# Patient Record
Sex: FEMALE | URBAN - METROPOLITAN AREA
[De-identification: names, ages, dates, MRNs, and addresses within clinical notes are randomized per-mention and may not be internally consistent; named-entity substitution may affect disease eponyms.]

---

## 2020-03-23 ENCOUNTER — OFFICE VISIT (OUTPATIENT)
Dept: PRIMARY CARE CLINIC | Facility: CLINIC | Age: 25
End: 2020-03-23
Payer: MEDICAID

## 2020-03-23 VITALS
SYSTOLIC BLOOD PRESSURE: 122 MMHG | TEMPERATURE: 100 F | OXYGEN SATURATION: 98 % | DIASTOLIC BLOOD PRESSURE: 84 MMHG | HEART RATE: 127 BPM

## 2020-03-23 DIAGNOSIS — B34.9 VIRAL ILLNESS: Primary | ICD-10-CM

## 2020-03-23 LAB
INFLUENZA A, MOLECULAR: NEGATIVE
INFLUENZA B, MOLECULAR: NEGATIVE
SPECIMEN SOURCE: NORMAL

## 2020-03-23 PROCEDURE — 87502 INFLUENZA DNA AMP PROBE: CPT

## 2020-03-23 PROCEDURE — 99203 OFFICE O/P NEW LOW 30 MIN: CPT | Mod: S$GLB,,, | Performed by: INTERNAL MEDICINE

## 2020-03-23 PROCEDURE — 99203 PR OFFICE/OUTPT VISIT, NEW, LEVL III, 30-44 MIN: ICD-10-PCS | Mod: S$GLB,,, | Performed by: INTERNAL MEDICINE

## 2020-03-23 NOTE — PROGRESS NOTES
Patient ID: Marva Elizondo is a 24 y.o. female with no prior medical history     Chief Complaint: Fever (102, 99) and Cough (body aches )      HPI -  Fever   Date of symptom onset - started on Saturday at 99 then worsened to 102 temp today. She also has body aches and dry cough that started today. She did not tylenol this am.     Denies diarrhea and GI complaints.   Is eating and drinking fluids.   Denies sob  Works at [employer]        Recent Travel? N   Healthcare Worker? N   Residence? Lives at home with parents and sibling who none have similar sx             No past surgical history on file.    No past medical history on file.    Social History     Socioeconomic History    Marital status: Single     Spouse name: Not on file    Number of children: Not on file    Years of education: Not on file    Highest education level: Not on file   Occupational History    Not on file   Social Needs    Financial resource strain: Not on file    Food insecurity:     Worry: Not on file     Inability: Not on file    Transportation needs:     Medical: Not on file     Non-medical: Not on file   Tobacco Use    Smoking status: Not on file   Substance and Sexual Activity    Alcohol use: Not on file    Drug use: Not on file    Sexual activity: Not on file   Lifestyle    Physical activity:     Days per week: Not on file     Minutes per session: Not on file    Stress: Not on file   Relationships    Social connections:     Talks on phone: Not on file     Gets together: Not on file     Attends Spiritism service: Not on file     Active member of club or organization: Not on file     Attends meetings of clubs or organizations: Not on file     Relationship status: Not on file   Other Topics Concern    Not on file   Social History Narrative    Not on file       Review of Systems   All other systems reviewed and are negative.       Objective:   /84   Pulse (!) 127   Temp 99.8 °F (37.7 °C)   SpO2 98%     Physical Exam    Constitutional: She is oriented to person, place, and time. She appears well-developed and well-nourished.   Appears ill    HENT:   Head: Normocephalic.   Eyes: Conjunctivae and EOM are normal.   Cardiovascular: Regular rhythm.   Tachycardic    Pulmonary/Chest: Effort normal and breath sounds normal.   Lymphadenopathy:     She has no cervical adenopathy.   Neurological: She is alert and oriented to person, place, and time.   Vitals reviewed.          No results found for: WBC, HGB, HCT, PLT, CHOL, TRIG, HDL, LDLDIRECT, ALT, AST, NA, K, CL, CREATININE, BUN, CO2, TSH, PSA, INR, GLUF, HGBA1C, MICROALBUR    No current outpatient medications on file prior to visit.     No current facility-administered medications on file prior to visit.        Assessment:   24 y.o. female with no prior medical hx with sx concerning for viral illness.     Plan:          Covid-19 risk - does not meet criteria for testing given does not have high risk qualifier.     Influenza: negative   Discussed with patient that she  could have COVID-19 or another viral illness and would take precautions for COVID such as limit travel outside of home.  Recommend for pt to stay home from work until her sx resolve starting 14 days from start of sx.          Drink plenty of fluids. Take tylenol/ibuprofen as needed. Given instruction on when to seek further evaluation in urgent care or ED if develops worsening symptoms such as shortness of breath, chest pain.     Carmel Menjivar MD  Internal Medicine- Geriatrics

## 2020-03-24 ENCOUNTER — TELEPHONE (OUTPATIENT)
Dept: FAMILY MEDICINE | Facility: CLINIC | Age: 25
End: 2020-03-24

## 2020-03-24 NOTE — TELEPHONE ENCOUNTER
----- Message from Jong Rodgers sent at 3/23/2020  4:56 PM CDT -----  Contact: pt  Type:  Sooner Apoointment Request    Caller is requesting a sooner appointment.  Caller declined first available appointment listed below.  Caller will not accept being placed on the waitlist and is requesting a message be sent to doctor.    Name of Caller:  Pt   When is the first available appointment? 03/31/2020   Symptoms:    Best Call Back Number:     Additional Information:  Called bc she has aches, fever 101.5 and sniffling, would like to be seen asap

## 2020-03-24 NOTE — TELEPHONE ENCOUNTER
----- Message from Joslyn Mistry sent at 3/24/2020 10:36 AM CDT -----  Contact: pt  Type:  Test Results    Who Called:  Pt   Name of Test (Lab/Mammo/Etc):  Covid testing  Date of Test:  03/23  Ordering Provider:  Dr Pavon  Where the test was performed:  [facility]  Best Call Back Number:  [phone] (home)     Additional Information:  na

## 2020-03-27 ENCOUNTER — NURSE TRIAGE (OUTPATIENT)
Dept: ADMINISTRATIVE | Facility: CLINIC | Age: 25
End: 2020-03-27

## 2020-03-27 ENCOUNTER — TELEPHONE (OUTPATIENT)
Dept: FAMILY MEDICINE | Facility: CLINIC | Age: 25
End: 2020-03-27

## 2020-03-27 NOTE — TELEPHONE ENCOUNTER
----- Message from Darya Rowley sent at 3/27/2020 12:53 PM CDT -----  Contact: pt  Type:  Patient Returning Call    Who Called:  pt  Who Left Message for Patient:  office  Does the patient know what this is regarding?:  Please return call to pt regarding results.  Best Call Back Number:    Additional Information:  Thank you

## 2020-03-28 NOTE — TELEPHONE ENCOUNTER
Reason for Disposition   Message left on identified answering machine.    Additional Information   Negative: Caller is angry or rude (e.g., hangs up, verbally abusive, yelling)   Negative: Caller hangs up   Negative: Caller has already spoken with the PCP and has no further questions.   Negative: Caller has already spoken with another triager and has no further questions.   Negative: Caller has already spoken with another triager or PCP AND has further questions AND triager able to answer questions.   Negative: No answer.  First attempt to contact caller.  Follow-up call scheduled within 15 minutes.   Negative: Busy signal.  First attempt to contact caller.  Follow-up call scheduled within 15 minutes.    Protocols used: ST NO CONTACT OR DUPLICATE CONTACT CALL-A-AH

## 2020-07-03 ENCOUNTER — OCCUPATIONAL HEALTH (OUTPATIENT)
Dept: URGENT CARE | Facility: CLINIC | Age: 25
End: 2020-07-03

## 2020-07-03 PROCEDURE — 80305 DRUG TEST PRSMV DIR OPT OBS: CPT | Mod: S$GLB,,, | Performed by: EMERGENCY MEDICINE

## 2020-07-03 PROCEDURE — 80305 PR DRUG SCREEN - 1: ICD-10-PCS | Mod: S$GLB,,, | Performed by: EMERGENCY MEDICINE

## 2020-07-10 ENCOUNTER — OCCUPATIONAL HEALTH (OUTPATIENT)
Dept: URGENT CARE | Facility: CLINIC | Age: 25
End: 2020-07-10

## 2020-07-10 DIAGNOSIS — Z02.89 ENCOUNTER FOR PHYSICAL EXAMINATION RELATED TO EMPLOYMENT: ICD-10-CM

## 2020-07-10 PROCEDURE — 86580 TB INTRADERMAL TEST: CPT | Mod: S$GLB,,, | Performed by: EMERGENCY MEDICINE

## 2020-07-10 PROCEDURE — 86580 PR  TB INTRADERMAL TEST: ICD-10-PCS | Mod: S$GLB,,, | Performed by: EMERGENCY MEDICINE

## 2020-07-30 DIAGNOSIS — N64.59 OTHER SIGNS AND SYMPTOMS IN BREAST: Primary | ICD-10-CM

## 2020-10-28 ENCOUNTER — OCCUPATIONAL HEALTH (OUTPATIENT)
Dept: URGENT CARE | Facility: CLINIC | Age: 25
End: 2020-10-28
Payer: MEDICAID

## 2020-10-28 PROCEDURE — 90471 PR IMMUNIZ ADMIN,1 SINGLE/COMB VAC/TOXOID: ICD-10-PCS | Mod: S$GLB,,, | Performed by: EMERGENCY MEDICINE

## 2020-10-28 PROCEDURE — 90686 IIV4 VACC NO PRSV 0.5 ML IM: CPT | Mod: S$GLB,,, | Performed by: EMERGENCY MEDICINE

## 2020-10-28 PROCEDURE — 90686 PR FLU VACCINE, QIIV4, NO PRSV, 0.5 ML, IM: ICD-10-PCS | Mod: S$GLB,,, | Performed by: EMERGENCY MEDICINE

## 2020-10-28 PROCEDURE — 90471 IMMUNIZATION ADMIN: CPT | Mod: S$GLB,,, | Performed by: EMERGENCY MEDICINE

## 2021-05-06 ENCOUNTER — PATIENT MESSAGE (OUTPATIENT)
Dept: RESEARCH | Facility: HOSPITAL | Age: 26
End: 2021-05-06

## 2025-04-10 ENCOUNTER — HOSPITAL ENCOUNTER (OUTPATIENT)
Facility: HOSPITAL | Age: 30
Discharge: HOME | End: 2025-04-12
Attending: EMERGENCY MEDICINE | Admitting: HOSPITALIST
Payer: COMMERCIAL

## 2025-04-10 DIAGNOSIS — K52.9 GASTROENTERITIS: ICD-10-CM

## 2025-04-10 DIAGNOSIS — E86.9 VOLUME DEPLETION: Primary | ICD-10-CM

## 2025-04-10 DIAGNOSIS — D50.9 IRON DEFICIENCY ANEMIA, UNSPECIFIED IRON DEFICIENCY ANEMIA TYPE: ICD-10-CM

## 2025-04-10 DIAGNOSIS — R00.0 TACHYCARDIA: ICD-10-CM

## 2025-04-10 PROBLEM — E87.6 HYPOKALEMIA: Status: ACTIVE | Noted: 2025-04-10

## 2025-04-10 PROBLEM — D64.9 ANEMIA: Status: ACTIVE | Noted: 2025-04-10

## 2025-04-10 PROBLEM — E83.42 HYPOMAGNESEMIA: Status: ACTIVE | Noted: 2025-04-10

## 2025-04-10 LAB
ABSOLUTE EOSINOPHIL (SMH): 0.06 K/UL
ABSOLUTE MONOCYTE (SMH): 0.27 K/UL (ref 0.3–1)
ABSOLUTE NEUTROPHIL COUNT (SMH): 7.3 K/UL (ref 1.8–7.7)
ADV 40+41 DNA STL QL NAA+NON-PROBE: NOT DETECTED
ALBUMIN SERPL-MCNC: 4.1 G/DL (ref 3.5–5.2)
ALP SERPL-CCNC: 61 UNIT/L (ref 55–135)
ALT SERPL-CCNC: 10 UNIT/L (ref 10–44)
ANION GAP (SMH): 7 MMOL/L (ref 8–16)
AST SERPL-CCNC: 14 UNIT/L (ref 10–40)
ASTRO TYP 1-8 RNA STL QL NAA+NON-PROBE: DETECTED
B-HCG UR QL: NEGATIVE
BACTERIA #/AREA URNS AUTO: ABNORMAL /HPF
BASOPHILS # BLD AUTO: 0.01 K/UL
BASOPHILS NFR BLD AUTO: 0.1 %
BILIRUB SERPL-MCNC: 0.4 MG/DL (ref 0.1–1)
BILIRUB UR QL STRIP.AUTO: NEGATIVE
BUN SERPL-MCNC: 11 MG/DL (ref 6–20)
C CAYETANENSIS DNA STL QL NAA+NON-PROBE: NOT DETECTED
C COLI+JEJ+UPSA DNA STL QL NAA+NON-PROBE: NOT DETECTED
C DIFF GDH STL QL: NEGATIVE
C DIFF TOX A+B STL QL IA: NEGATIVE
CALCIUM SERPL-MCNC: 8.9 MG/DL (ref 8.7–10.5)
CHLORIDE SERPL-SCNC: 105 MMOL/L (ref 95–110)
CLARITY UR: ABNORMAL
CO2 SERPL-SCNC: 25 MMOL/L (ref 23–29)
COLOR UR AUTO: YELLOW
CREAT SERPL-MCNC: 0.6 MG/DL (ref 0.5–1.4)
CREAT SERPL-MCNC: 0.7 MG/DL (ref 0.5–1.4)
CRYPTOSP DNA STL QL NAA+NON-PROBE: NOT DETECTED
CTP QC/QA: YES
E HISTOLYT DNA STL QL NAA+NON-PROBE: NOT DETECTED
EAEC PAA PLAS AGGR+AATA ST NAA+NON-PRB: NOT DETECTED
EC STX1+STX2 GENES STL QL NAA+NON-PROBE: NOT DETECTED
EPEC EAE GENE STL QL NAA+NON-PROBE: NOT DETECTED
ERYTHROCYTE [DISTWIDTH] IN BLOOD BY AUTOMATED COUNT: 17.9 % (ref 11.5–14.5)
ETEC LTA+ST1A+ST1B TOX ST NAA+NON-PROBE: NOT DETECTED
FERRITIN SERPL-MCNC: 2.3 NG/ML (ref 20–300)
G LAMBLIA DNA STL QL NAA+NON-PROBE: NOT DETECTED
GFR SERPLBLD CREATININE-BSD FMLA CKD-EPI: >60 ML/MIN/1.73/M2
GLUCOSE SERPL-MCNC: 105 MG/DL (ref 70–110)
GLUCOSE UR QL STRIP: NEGATIVE
GROUP A STREP MOLECULAR (OHS): NEGATIVE
HCT VFR BLD AUTO: 29.9 % (ref 37–48.5)
HCV AB SERPL QL IA: NORMAL
HGB BLD-MCNC: 8.7 GM/DL (ref 12–16)
HGB UR QL STRIP: ABNORMAL
HIV 1+2 AB+HIV1 P24 AG SERPL QL IA: NORMAL
IMM GRANULOCYTES # BLD AUTO: 0.03 K/UL (ref 0–0.04)
IMM GRANULOCYTES NFR BLD AUTO: 0.3 % (ref 0–0.5)
INFLUENZA A MOLECULAR (OHS): NEGATIVE
INFLUENZA B MOLECULAR (OHS): NEGATIVE
IRON SATN MFR SERPL: <10 % (ref 20–50)
IRON SERPL-MCNC: 11 UG/DL (ref 30–160)
KETONES UR QL STRIP: NEGATIVE
LACTATE SERPL-SCNC: 1.3 MMOL/L (ref 0.5–1.9)
LDH SERPL L TO P-CCNC: 0.91 MMOL/L (ref 0.5–2.2)
LEUKOCYTE ESTERASE UR QL STRIP: ABNORMAL
LIPASE SERPL-CCNC: 42 U/L (ref 4–60)
LYMPHOCYTES # BLD AUTO: 0.93 K/UL (ref 1–4.8)
MAGNESIUM SERPL-MCNC: 1.5 MG/DL (ref 1.6–2.6)
MCH RBC QN AUTO: 19.2 PG (ref 27–31)
MCHC RBC AUTO-ENTMCNC: 29.1 G/DL (ref 32–36)
MCV RBC AUTO: 66 FL (ref 82–98)
MICROSCOPIC COMMENT: ABNORMAL
NITRITE UR QL STRIP: NEGATIVE
NOROVIRUS GI+II RNA STL QL NAA+NON-PROBE: NOT DETECTED
NUCLEATED RBC (/100WBC) (SMH): 0 /100 WBC
P SHIGELLOIDES DNA STL QL NAA+NON-PROBE: NOT DETECTED
PH UR STRIP: 6 [PH]
PLATELET # BLD AUTO: 712 K/UL (ref 150–450)
PMV BLD AUTO: 8.3 FL (ref 9.2–12.9)
POTASSIUM SERPL-SCNC: 3.4 MMOL/L (ref 3.5–5.1)
PROT SERPL-MCNC: 7.8 GM/DL (ref 6–8.4)
PROT UR QL STRIP: ABNORMAL
RBC # BLD AUTO: 4.53 M/UL (ref 4–5.4)
RBC #/AREA URNS AUTO: 1 /HPF
RELATIVE EOSINOPHIL (SMH): 0.7 % (ref 0–8)
RELATIVE LYMPHOCYTE (SMH): 10.8 % (ref 18–48)
RELATIVE MONOCYTE (SMH): 3.1 % (ref 4–15)
RELATIVE NEUTROPHIL (SMH): 85 % (ref 38–73)
RETICS/RBC NFR AUTO: 1.2 % (ref 0.5–2.5)
RVA RNA STL QL NAA+NON-PROBE: NOT DETECTED
S ENT+BONG DNA STL QL NAA+NON-PROBE: NOT DETECTED
SAMPLE: NORMAL
SAMPLE: NORMAL
SAPO I+II+IV+V RNA STL QL NAA+NON-PROBE: DETECTED
SARS-COV-2 RDRP RESP QL NAA+PROBE: NEGATIVE
SHIGELLA SP+EIEC IPAH ST NAA+NON-PROBE: NOT DETECTED
SODIUM SERPL-SCNC: 137 MMOL/L (ref 136–145)
SP GR UR STRIP: 1.02
SQUAMOUS #/AREA URNS AUTO: 8 /HPF
TIBC SERPL-MCNC: 620 UG/DL (ref 250–450)
TRANSFERRIN SERPL-MCNC: 443 MG/DL (ref 200–375)
TROPONIN HIGH SENSITIVE (SMH): <2.3 PG/ML
TSH SERPL-ACNC: 1.12 UIU/ML (ref 0.34–5.6)
UROBILINOGEN UR STRIP-ACNC: NEGATIVE EU/DL
V CHOL+PARA+VUL DNA STL QL NAA+NON-PROBE: NOT DETECTED
V CHOLERAE DNA STL QL NAA+NON-PROBE: NOT DETECTED
WBC # BLD AUTO: 8.6 K/UL (ref 3.9–12.7)
WBC #/AREA URNS AUTO: 6 /HPF
Y ENTEROCOL DNA STL QL NAA+NON-PROBE: NOT DETECTED

## 2025-04-10 PROCEDURE — 93005 ELECTROCARDIOGRAM TRACING: CPT | Performed by: GENERAL PRACTICE

## 2025-04-10 PROCEDURE — G0378 HOSPITAL OBSERVATION PER HR: HCPCS

## 2025-04-10 PROCEDURE — 85025 COMPLETE CBC W/AUTO DIFF WBC: CPT | Performed by: EMERGENCY MEDICINE

## 2025-04-10 PROCEDURE — 81025 URINE PREGNANCY TEST: CPT | Performed by: EMERGENCY MEDICINE

## 2025-04-10 PROCEDURE — 99285 EMERGENCY DEPT VISIT HI MDM: CPT | Mod: 25

## 2025-04-10 PROCEDURE — 83690 ASSAY OF LIPASE: CPT | Performed by: EMERGENCY MEDICINE

## 2025-04-10 PROCEDURE — 80053 COMPREHEN METABOLIC PANEL: CPT | Performed by: EMERGENCY MEDICINE

## 2025-04-10 PROCEDURE — 96367 TX/PROPH/DG ADDL SEQ IV INF: CPT

## 2025-04-10 PROCEDURE — 96376 TX/PRO/DX INJ SAME DRUG ADON: CPT

## 2025-04-10 PROCEDURE — 63600175 PHARM REV CODE 636 W HCPCS: Performed by: INTERNAL MEDICINE

## 2025-04-10 PROCEDURE — 25000003 PHARM REV CODE 250: Performed by: NURSE PRACTITIONER

## 2025-04-10 PROCEDURE — 25000003 PHARM REV CODE 250: Performed by: EMERGENCY MEDICINE

## 2025-04-10 PROCEDURE — 96366 THER/PROPH/DIAG IV INF ADDON: CPT

## 2025-04-10 PROCEDURE — 96375 TX/PRO/DX INJ NEW DRUG ADDON: CPT

## 2025-04-10 PROCEDURE — 84443 ASSAY THYROID STIM HORMONE: CPT | Performed by: EMERGENCY MEDICINE

## 2025-04-10 PROCEDURE — 81001 URINALYSIS AUTO W/SCOPE: CPT | Performed by: EMERGENCY MEDICINE

## 2025-04-10 PROCEDURE — 36415 COLL VENOUS BLD VENIPUNCTURE: CPT | Performed by: EMERGENCY MEDICINE

## 2025-04-10 PROCEDURE — 87507 IADNA-DNA/RNA PROBE TQ 12-25: CPT | Performed by: EMERGENCY MEDICINE

## 2025-04-10 PROCEDURE — 63600175 PHARM REV CODE 636 W HCPCS: Performed by: EMERGENCY MEDICINE

## 2025-04-10 PROCEDURE — 87040 BLOOD CULTURE FOR BACTERIA: CPT | Performed by: EMERGENCY MEDICINE

## 2025-04-10 PROCEDURE — 83605 ASSAY OF LACTIC ACID: CPT | Performed by: EMERGENCY MEDICINE

## 2025-04-10 PROCEDURE — 87449 NOS EACH ORGANISM AG IA: CPT | Performed by: EMERGENCY MEDICINE

## 2025-04-10 PROCEDURE — 83540 ASSAY OF IRON: CPT | Performed by: EMERGENCY MEDICINE

## 2025-04-10 PROCEDURE — 86803 HEPATITIS C AB TEST: CPT | Performed by: EMERGENCY MEDICINE

## 2025-04-10 PROCEDURE — 84484 ASSAY OF TROPONIN QUANT: CPT | Performed by: EMERGENCY MEDICINE

## 2025-04-10 PROCEDURE — 82728 ASSAY OF FERRITIN: CPT | Performed by: EMERGENCY MEDICINE

## 2025-04-10 PROCEDURE — 87651 STREP A DNA AMP PROBE: CPT | Performed by: EMERGENCY MEDICINE

## 2025-04-10 PROCEDURE — 87389 HIV-1 AG W/HIV-1&-2 AB AG IA: CPT | Performed by: EMERGENCY MEDICINE

## 2025-04-10 PROCEDURE — U0002 COVID-19 LAB TEST NON-CDC: HCPCS | Performed by: EMERGENCY MEDICINE

## 2025-04-10 PROCEDURE — 85045 AUTOMATED RETICULOCYTE COUNT: CPT | Performed by: EMERGENCY MEDICINE

## 2025-04-10 PROCEDURE — 96361 HYDRATE IV INFUSION ADD-ON: CPT

## 2025-04-10 PROCEDURE — 25000003 PHARM REV CODE 250: Performed by: INTERNAL MEDICINE

## 2025-04-10 PROCEDURE — 87502 INFLUENZA DNA AMP PROBE: CPT | Performed by: EMERGENCY MEDICINE

## 2025-04-10 PROCEDURE — 96365 THER/PROPH/DIAG IV INF INIT: CPT

## 2025-04-10 PROCEDURE — 83735 ASSAY OF MAGNESIUM: CPT | Performed by: EMERGENCY MEDICINE

## 2025-04-10 PROCEDURE — 82565 ASSAY OF CREATININE: CPT

## 2025-04-10 PROCEDURE — 25500020 PHARM REV CODE 255: Performed by: EMERGENCY MEDICINE

## 2025-04-10 PROCEDURE — 93010 ELECTROCARDIOGRAM REPORT: CPT | Mod: ,,, | Performed by: GENERAL PRACTICE

## 2025-04-10 RX ORDER — HYOSCYAMINE SULFATE 0.5 MG/ML
0.12 INJECTION, SOLUTION SUBCUTANEOUS
Status: COMPLETED | OUTPATIENT
Start: 2025-04-10 | End: 2025-04-10

## 2025-04-10 RX ORDER — LANOLIN ALCOHOL/MO/W.PET/CERES
800 CREAM (GRAM) TOPICAL
Status: DISCONTINUED | OUTPATIENT
Start: 2025-04-10 | End: 2025-04-12 | Stop reason: HOSPADM

## 2025-04-10 RX ORDER — ONDANSETRON HYDROCHLORIDE 2 MG/ML
4 INJECTION, SOLUTION INTRAVENOUS
Status: COMPLETED | OUTPATIENT
Start: 2025-04-10 | End: 2025-04-10

## 2025-04-10 RX ORDER — SODIUM CHLORIDE 9 MG/ML
INJECTION, SOLUTION INTRAVENOUS CONTINUOUS
Status: DISCONTINUED | OUTPATIENT
Start: 2025-04-10 | End: 2025-04-10

## 2025-04-10 RX ORDER — MAGNESIUM SULFATE HEPTAHYDRATE 40 MG/ML
2 INJECTION, SOLUTION INTRAVENOUS ONCE
Status: COMPLETED | OUTPATIENT
Start: 2025-04-10 | End: 2025-04-10

## 2025-04-10 RX ORDER — SODIUM CHLORIDE 9 MG/ML
INJECTION, SOLUTION INTRAVENOUS CONTINUOUS
Status: DISCONTINUED | OUTPATIENT
Start: 2025-04-10 | End: 2025-04-12 | Stop reason: HOSPADM

## 2025-04-10 RX ORDER — SODIUM,POTASSIUM PHOSPHATES 280-250MG
2 POWDER IN PACKET (EA) ORAL
Status: DISCONTINUED | OUTPATIENT
Start: 2025-04-10 | End: 2025-04-12 | Stop reason: HOSPADM

## 2025-04-10 RX ORDER — SODIUM CHLORIDE 0.9 % (FLUSH) 0.9 %
2 SYRINGE (ML) INJECTION EVERY 8 HOURS PRN
Status: DISCONTINUED | OUTPATIENT
Start: 2025-04-10 | End: 2025-04-12 | Stop reason: HOSPADM

## 2025-04-10 RX ORDER — FAMOTIDINE 20 MG/1
20 TABLET, FILM COATED ORAL 2 TIMES DAILY
Status: DISCONTINUED | OUTPATIENT
Start: 2025-04-10 | End: 2025-04-12 | Stop reason: HOSPADM

## 2025-04-10 RX ORDER — ONDANSETRON HYDROCHLORIDE 2 MG/ML
4 INJECTION, SOLUTION INTRAVENOUS EVERY 6 HOURS PRN
Status: DISCONTINUED | OUTPATIENT
Start: 2025-04-10 | End: 2025-04-12 | Stop reason: HOSPADM

## 2025-04-10 RX ORDER — IBUPROFEN 400 MG/1
400 TABLET ORAL EVERY 6 HOURS PRN
Status: DISCONTINUED | OUTPATIENT
Start: 2025-04-10 | End: 2025-04-12 | Stop reason: HOSPADM

## 2025-04-10 RX ORDER — ACETAMINOPHEN 325 MG/1
650 TABLET ORAL EVERY 4 HOURS PRN
Status: DISCONTINUED | OUTPATIENT
Start: 2025-04-10 | End: 2025-04-12 | Stop reason: HOSPADM

## 2025-04-10 RX ORDER — SODIUM CHLORIDE 9 MG/ML
1000 INJECTION, SOLUTION INTRAVENOUS
Status: COMPLETED | OUTPATIENT
Start: 2025-04-10 | End: 2025-04-10

## 2025-04-10 RX ORDER — NALOXONE HCL 0.4 MG/ML
0.02 VIAL (ML) INJECTION
Status: DISCONTINUED | OUTPATIENT
Start: 2025-04-10 | End: 2025-04-12 | Stop reason: HOSPADM

## 2025-04-10 RX ADMIN — SODIUM CHLORIDE 1000 ML: 9 INJECTION, SOLUTION INTRAVENOUS at 04:04

## 2025-04-10 RX ADMIN — FAMOTIDINE 20 MG: 20 TABLET, FILM COATED ORAL at 09:04

## 2025-04-10 RX ADMIN — SODIUM CHLORIDE 1000 ML: 9 INJECTION, SOLUTION INTRAVENOUS at 12:04

## 2025-04-10 RX ADMIN — POTASSIUM BICARBONATE 25 MEQ: 978 TABLET, EFFERVESCENT ORAL at 04:04

## 2025-04-10 RX ADMIN — ACETAMINOPHEN 650 MG: 325 TABLET ORAL at 06:04

## 2025-04-10 RX ADMIN — MAGNESIUM SULFATE HEPTAHYDRATE 2 G: 40 INJECTION, SOLUTION INTRAVENOUS at 04:04

## 2025-04-10 RX ADMIN — ONDANSETRON 4 MG: 2 INJECTION INTRAMUSCULAR; INTRAVENOUS at 12:04

## 2025-04-10 RX ADMIN — IOHEXOL 100 ML: 350 INJECTION, SOLUTION INTRAVENOUS at 02:04

## 2025-04-10 RX ADMIN — SODIUM CHLORIDE 1000 ML: 9 INJECTION, SOLUTION INTRAVENOUS at 09:04

## 2025-04-10 RX ADMIN — PIPERACILLIN AND TAZOBACTAM 4.5 G: 4; .5 INJECTION, POWDER, LYOPHILIZED, FOR SOLUTION INTRAVENOUS; PARENTERAL at 09:04

## 2025-04-10 RX ADMIN — HYOSCYAMINE SULFATE 0.12 MG: 0.5 INJECTION, SOLUTION SUBCUTANEOUS at 12:04

## 2025-04-10 RX ADMIN — IBUPROFEN 400 MG: 400 TABLET, FILM COATED ORAL at 09:04

## 2025-04-10 RX ADMIN — HYOSCYAMINE SULFATE 0.12 MG: 0.5 INJECTION, SOLUTION SUBCUTANEOUS at 03:04

## 2025-04-10 NOTE — ASSESSMENT & PLAN NOTE
Patient has Abnormal Magnesium: hypomagnesemia. Will continue to monitor electrolytes closely. Will replace the affected electrolytes and repeat labs to be done after interventions completed. The patient's magnesium results have been reviewed and are listed below.  Recent Labs   Lab 04/10/25  1247   MG 1.5*

## 2025-04-10 NOTE — H&P
[facility] - Emergency Dept  Hospital Medicine  History & Physical    Patient Name: Marva Elizondo  MRN: [id number]  Patient Class: OP- Observation  Admission Date: 4/10/2025  Attending Physician: Roni Barnett MD   Primary Care Provider: Nimco Underwood NP         Patient information was obtained from patient, past medical records, and ER records.     Subjective:     Principal Problem:Gastroenteritis    Chief Complaint:   Chief Complaint   Patient presents with    Diarrhea     Diarrhea and vomiting x 6 days with abdominal pain and a cough    Abdominal Pain    Cough    Vomiting        HPI: 29-year-old female with no significant medical history presents to the emergency room for vomiting and diarrhea for 6 days, with abdominal pain, fever of 101, body aches, nausea, and diarrhea.    In the ER, CBC with hemoglobin 8.6 and MCV 60 iron studies consistent with iron-deficiency anemia, CMP with potassium 3.4, Mag 1.5 EKG with normal sinus rhythm, C diff negative, influenza negative COVID negative chest x-ray with no infiltrates or consolidation.  Cat scan of abdomen likely enteritis.  Stool culture sent positive for Astrovirus and Sapovirus   Magnesium and potassium replaced in ER. Levsin given, IV NS bolus given in ER.     Admit to hospital medicine for gastroenteritis, electrolyte imbalance.     No past medical history on file.    No past surgical history on file.    Review of patient's allergies indicates:  No Known Allergies    No current facility-administered medications on file prior to encounter.     Current Outpatient Medications on File Prior to Encounter   Medication Sig    [DISCONTINUED] ketorolac (TORADOL) 10 mg tablet Take 1 tablet (10 mg total) by mouth every 6 (six) hours.     Family History    None       Tobacco Use    Smoking status: Never    Smokeless tobacco: Never   Substance and Sexual Activity    Alcohol use: Never    Drug use: Not on file    Sexual activity: Not on file     Review  of Systems   Constitutional:  Positive for appetite change, chills and fever.   HENT: Negative.     Respiratory:  Positive for cough.    Cardiovascular: Negative.    Gastrointestinal:  Positive for abdominal pain, diarrhea, nausea and vomiting. Negative for blood in stool and constipation.   Genitourinary: Negative.    Musculoskeletal: Negative.    Skin: Negative.    Neurological: Negative.    Psychiatric/Behavioral: Negative.       Objective:     Vital Signs (Most Recent):  Temp: 98.5 °F (36.9 °C) (04/10/25 1152)  Pulse: 105 (04/10/25 1632)  Resp: 16 (04/10/25 1632)  BP: 130/60 (04/10/25 1632)  SpO2: 100 % (04/10/25 1632) Vital Signs (24h Range):  Temp:  [98.5 °F (36.9 °C)] 98.5 °F (36.9 °C)  Pulse:  [105-112] 105  Resp:  [16-18] 16  SpO2:  [98 %-100 %] 100 %  BP: (107-130)/(60-77) 130/60     Weight: 74.8 kg (165 lb)  Body mass index is 28.32 kg/m².     Physical Exam  Vitals reviewed.   Constitutional:       General: She is not in acute distress.     Appearance: Normal appearance. She is not ill-appearing.   HENT:      Head: Normocephalic and atraumatic.      Mouth/Throat:      Mouth: Mucous membranes are dry.   Eyes:      Extraocular Movements: Extraocular movements intact.      Pupils: Pupils are equal, round, and reactive to light.   Cardiovascular:      Rate and Rhythm: Tachycardia present.      Pulses: Normal pulses.   Pulmonary:      Effort: Pulmonary effort is normal.      Breath sounds: No wheezing.   Abdominal:      Comments: Diffuse abdominal tenderness.    Musculoskeletal:         General: Normal range of motion.      Cervical back: Neck supple.   Skin:     General: Skin is warm.      Capillary Refill: Capillary refill takes 2 to 3 seconds.   Neurological:      General: No focal deficit present.      Mental Status: She is alert. Mental status is at baseline.   Psychiatric:         Mood and Affect: Mood normal.          CRANIAL NERVES     CN III, IV, VI   Pupils are equal, round, and reactive to light.      Significant Labs: All pertinent labs within the past 24 hours have been reviewed.  CBC:   Recent Labs   Lab 04/10/25  1247   WBC 8.60   HGB 8.7*   HCT 29.9*   *     CMP:   Recent Labs   Lab 04/10/25  1247      K 3.4*   CO2 25   BUN 11   CREATININE 0.7   CALCIUM 8.9   ALBUMIN 4.1   BILITOT 0.4   ALKPHOS 61   AST 14   ALT 10     Magnesium:   Recent Labs   Lab 04/10/25  1247   MG 1.5*     TSH:   Recent Labs   Lab 04/10/25  1247   TSH 1.116     Urine Studies:   Recent Labs   Lab 04/10/25  1150   APPEARANCEUA Cloudy*   SPECGRAV 1.020   PROTEINUA Trace*   BILIRUBINUA Negative   UROBILINOGEN Negative   LEUKOCYTESUR Trace*   RBCUA 1   WBCUA 6*   BACTERIA Moderate*       Significant Imaging: I have reviewed all pertinent imaging results/findings within the past 24 hours.  Imaging Results              CT Abdomen Pelvis With IV Contrast NO Oral Contrast (Final result)  Result time 04/10/25 14:39:56      Final result by Darlene Emery MD (04/10/25 14:39:56)                   Impression:      Fluid-filled loops of small bowel and colon without distension or wall thickening.  Findings may be secondary to enteritis.    4.0 x 5.3 cm intramural fibroid      Electronically signed by: Darlene Emery  Date:    04/10/2025  Time:    14:39               Narrative:      CMS MANDATED QUALITY DATA - CT RADIATION - 436    All CT scans at this facility utilize dose modulation, iterative reconstruction, and/or weight based dosing when appropriate to reduce radiation dose to as low as reasonably achievable.    CLINICAL HISTORY:  ([id number])28 y/o  (1995) F    Abdominal abscess/infection suspected;    TECHNIQUE:  (A#[id number], exam time 4/10/2025 14:31)    CT ABDOMEN PELVIS WITH IV CONTRAST [id number]    Axial CT images of the abdomen and pelvis were obtained from the dome of the diaphragm to the proximal thighs.    100cc omnipague 350 IV contrast was given    COMPARISON:  None available.    FINDINGS:  Lower Thorax:    The  lung bases are clear. The heart is normal in size.    CT Abdomen:    Liver: The liver is normal in size and imaging appearance.    Gallbladder: The gallbladder is within normal limits.    Biliary Tree: No intra or extrahepatic ductal dilation.    Spleen: Normal.    Pancreas: The pancreas is normal.    Adrenal Glands: Normal    Kidneys: The kidneys are normal in imaging appearance without hydronephrosis or hydroureter.    Vasculature: The aorta is normal in course and caliber.    Lymph nodes: No abdominal lymphadenopathy is seen.    Intraperitoneal structures: There is no ascites.    Bowel: Stomach is normal.  There are fluid-filled loops of small bowel and colon which are not dilated or thickened.  Findings may be secondary to enteritis.    Abdominal wall: The abdominal wall and musculature are normal.    Musculoskeletal: Normal.    CT Pelvis:    Bladder: Normal.    Reproductive Organs: There is a 4.0 x 5.3 cm intramural fibroid.  The ovaries are normal.    Pelvic Lymph nodes: No pelvic lymphadenopathy or pelvic mass is identified.                                       X-Ray Chest PA And Lateral (Final result)  Result time 04/10/25 12:25:22   Procedure changed from X-Ray Chest AP Portable     Final result by Juan M Starkey MD (04/10/25 12:25:22)                   Impression:      1. No acute process.      Electronically signed by: Juan M Starkey  Date:    04/10/2025  Time:    12:25               Narrative:    EXAMINATION:  XR CHEST PA AND LATERAL    CLINICAL HISTORY:  Cough;    COMPARISON:  March 2021    FINDINGS:  PA and lateral views demonstrate no cardiac, pulmonary, or acute osseous abnormalities.                                       Assessment/Plan:     Assessment & Plan  Gastroenteritis  + Sapovirus and astrovirus, contact precautions  CT abdomen with enteritis  Treat symptomatically  [place] diet.     Anemia  Anemia is likely due to Iron deficiency. Most recent hemoglobin and hematocrit are listed  below.  Recent Labs     04/10/25  1247   HGB 8.7*   HCT 29.9*     Plan  - Monitor serial CBC: Daily  - Transfuse PRBC if patient becomes hemodynamically unstable, symptomatic or H/H drops below 7/21.    Hypokalemia  Patient's most recent potassium results are listed below.   Recent Labs     04/10/25  1247   K 3.4*     Plan  - Replete potassium per protocol  - Monitor potassium Daily  - Patient's hypokalemia is worsening. Will continue current treatment    Hypomagnesemia  Patient has Abnormal Magnesium: hypomagnesemia. Will continue to monitor electrolytes closely. Will replace the affected electrolytes and repeat labs to be done after interventions completed. The patient's magnesium results have been reviewed and are listed below.  Recent Labs   Lab 04/10/25  1247   MG 1.5*      Volume depletion  Repleat with NS infusion    VTE Risk Mitigation (From admission, onward)           Ordered     IP VTE LOW RISK PATIENT  Once         04/10/25 1633     Place sequential compression device  Until discontinued         04/10/25 1633                         On 04/10/2025, patient should be placed in hospital observation services under my care in collaboration with Dr. Barnett.           Adrianne Asif, NP  Department of Hospital Medicine  [facility] - Emergency Dept

## 2025-04-10 NOTE — LETTER
April 12, 2025         [address] [place] [address] [address] [id number]       Patient: Marva Elizondo   YOB: 1995  Date of Admission 4/10/2025    To Whom It May Concern:    Please be advised that Marva Elizondo was admitted to [facility] on 4/10/2025. She was treated and discharged on 4/12/2025. She may return to work on 4/14/2025 with no restrictions. Thank you.    Sincerely,    Megan Hays RN

## 2025-04-10 NOTE — ED PROVIDER NOTES
Encounter Date: 4/10/2025       History     Chief Complaint   Patient presents with    Diarrhea     Diarrhea and vomiting x 6 days with abdominal pain and a cough    Abdominal Pain    Cough    Vomiting     Patient reports fever of 101° F about 6 days ago.  She had body aches then.  She subsequently developed nausea with vomiting and multiple episodes of diarrhea per day.  Diffuse abdominal cramping.  She does have nonproductive cough.  No fever over last several days.  No recent antibiotic use.  No previous GI problems.      Review of patient's allergies indicates:  No Known Allergies  No past medical history on file.  No past surgical history on file.  No family history on file.  Social History[1]  Review of Systems   Constitutional:  Positive for chills and fever.   HENT:  Positive for congestion.    Eyes:  Negative for visual disturbance.   Respiratory:  Positive for cough. Negative for shortness of breath.    Cardiovascular:  Negative for chest pain and palpitations.   Gastrointestinal:  Positive for abdominal pain, diarrhea, nausea and vomiting. Negative for blood in stool.   Genitourinary:  Negative for dysuria.   Musculoskeletal:  Negative for joint swelling.   Neurological:  Negative for headaches.   Psychiatric/Behavioral:  Negative for confusion.        Physical Exam     Initial Vitals [04/10/25 1152]   BP Pulse Resp Temp SpO2   107/77 (!) 112 18 98.5 °F (36.9 °C) 98 %      MAP       --         Physical Exam    Nursing note and vitals reviewed.  Constitutional: She is not diaphoretic. No distress.   HENT:   Head: Normocephalic and atraumatic. Mouth/Throat: Oropharynx is clear and moist. No oropharyngeal exudate.   Eyes: Conjunctivae and EOM are normal. Pupils are equal, round, and reactive to light.   Neck:   Normal range of motion.  Cardiovascular:  Normal rate.           Pulmonary/Chest: Breath sounds normal.   Abdominal: Abdomen is soft. Bowel sounds are normal.   Mild diffuse abdominal cramping with no  guarding or rebound   Musculoskeletal:         General: Normal range of motion.      Cervical back: Normal range of motion.     Neurological: She is alert.   No gross deficits   Skin: No rash noted.   Psychiatric: She has a normal mood and affect.         ED Course   Procedures  Labs Reviewed   URINALYSIS, REFLEX TO URINE CULTURE - Abnormal       Result Value    Color, UA Yellow      Appearance, UA Cloudy (*)     Spec Grav UA 1.020      pH, UA 6.0      Protein, UA Trace (*)     Glucose, UA Negative      Ketones, UA Negative      Blood, UA 2+ (*)     Bilirubin, UA Negative      Urobilinogen, UA Negative      Nitrites, UA Negative      Leukocyte Esterase, UA Trace (*)    MAGNESIUM - Abnormal    Magnesium 1.5 (*)    COMPREHENSIVE METABOLIC PANEL - Abnormal    Sodium 137      Potassium 3.4 (*)     Chloride 105      CO2 25      Glucose 105      BUN 11      Creatinine 0.7      Calcium 8.9      Protein Total 7.8      Albumin 4.1      Bilirubin Total 0.4      ALP 61      AST 14      ALT 10      Anion Gap 7 (*)     eGFR >60     GASTROINTESTINAL PATHOGENS PANEL, PCR - Abnormal    CAMPYLOBACTER Not Detected      PLESIOMONAS SHIGELLOIDES Not Detected      SALMONELLA Not Detected      Vibrio sp. Not Detected      VIBRIO CHOLERAE Not Detected      YERSINIA ENTEROCOLITICA Not Detected      ENTEROAGGREGATIVE E. COLI (EAEC) Not Detected      ENTEROPATHOGENIC E. COLI (EPEC) Not Detected      Enterotoxigenic E. coli (ETEC) Not Detected      SHIGA-LIKE TOXIN-PRODUCING E. COLI (STEC) Not Detected      Shigella/Enteroinvasive E. coli (EIEC) Not Detected      CRYPTOSPORIDIUM Not Detected      Cyclospora cayetanensis Not Detected      Entamoeba histolytica Not Detected      Giardia lamblia Not Detected      Adenovirus F 40/41 Not Detected      Astrovirus Detected (*)     Norovirus GI/GII Not Detected      Rotavirus A Not Detected      Sapovirus Detected (*)    CBC WITH DIFFERENTIAL - Abnormal    WBC 8.60      RBC 4.53      Hgb 8.7 (*)      Hct 29.9 (*)     MCV 66 (*)     MCH 19.2 (*)     MCHC 29.1 (*)     RDW 17.9 (*)     Platelet Count 712 (*)     MPV 8.3 (*)     Nucleated RBC 0      Neut % 85.0 (*)     Lymph % 10.8 (*)     Mono % 3.1 (*)     Eos % 0.7      Basophil % 0.1      Imm Grans % 0.3      Neut # 7.3      Lymph # 0.93 (*)     Mono # 0.27 (*)     Eos # 0.06      Baso # 0.01      Imm Grans # 0.03     URINALYSIS MICROSCOPIC - Abnormal    RBC, UA 1      WBC, UA 6 (*)     Bacteria, UA Moderate (*)     Squamous Epithelial Cells, UA 8      Microscopic Comment       FERRITIN - Abnormal    Ferritin 2.3 (*)    IRON AND TIBC - Abnormal    Iron Level 11 (*)     Transferrin 443 (*)     Iron Binding Capacity Total 620 (*)     Iron Saturation <10 (*)    CLOSTRIDIUM DIFFICILE - Normal    C. DIFFICILE GDH AG Negative      Clostridium Difficile Toxin A/B Negative     INFLUENZA A & B BY MOLECULAR - Normal    INFLUENZA A MOLECULAR Negative      INFLUENZA B MOLECULAR  Negative     LIPASE - Normal    Lipase Level 42     TROPONIN I HIGH SENSITIVITY - Normal    Troponin High Sensitive <2.3     SARS-COV-2 RNA AMPLIFICATION, QUAL - Normal    SARS COV-2 Molecular Negative     RETICULOCYTES - Normal    Retic Count, Automated 1.2     TSH - Normal    TSH 1.116     GROUP A STREP, MOLECULAR   CULTURE, BLOOD   CULTURE, BLOOD   CBC W/ AUTO DIFFERENTIAL    Narrative:     The following orders were created for panel order CBC Auto Differential.  Procedure                               Abnormality         Status                     ---------                               -----------         ------                     CBC with Differential[[id number]]        Abnormal            Final result                 Please view results for these tests on the individual orders.   HEPATITIS C ANTIBODY   HIV 1 / 2 ANTIBODY   POCT URINE PREGNANCY    POC Preg Test, Ur Negative       Acceptable Yes     ISTAT CREATININE    POC Creatinine 0.6      Sample VENOUS     ISTAT LACTATE     POC Lactate 0.91      Sample VENOUS     POCT CREATININE   POCT LACTATE        ECG Results              EKG 12-lead (In process)        Collection Time Result Time QRS Duration OHS QTC Calculation    04/10/25 12:18:27 04/10/25 12:24:03 84 421                     In process by Interface, Lab In [facility] (04/10/25 12:24:08)                   Narrative:    Test Reason : R00.0,    Vent. Rate :  97 BPM     Atrial Rate :  97 BPM     P-R Int : 150 ms          QRS Dur :  84 ms      QT Int : 332 ms       P-R-T Axes :  48  15  42 degrees    QTcB Int : 421 ms    Normal sinus rhythm  Normal ECG  When compared with ECG of 31-Mar-2021 19:16,  No significant change was found    Referred By: AAAREFERRAL SELF           Confirmed By:                                   Imaging Results              CT Abdomen Pelvis With IV Contrast NO Oral Contrast (Final result)  Result time 04/10/25 14:39:56      Final result by Darlene Emery MD (04/10/25 14:39:56)                   Impression:      Fluid-filled loops of small bowel and colon without distension or wall thickening.  Findings may be secondary to enteritis.    4.0 x 5.3 cm intramural fibroid      Electronically signed by: Darlene Emery  Date:    04/10/2025  Time:    14:39               Narrative:      CMS MANDATED QUALITY DATA - CT RADIATION - 436    All CT scans at this facility utilize dose modulation, iterative reconstruction, and/or weight based dosing when appropriate to reduce radiation dose to as low as reasonably achievable.    CLINICAL HISTORY:  ([id number])30 y/o  (1995) F    Abdominal abscess/infection suspected;    TECHNIQUE:  (A#[id number], exam time 4/10/2025 14:31)    CT ABDOMEN PELVIS WITH IV CONTRAST [id number]    Axial CT images of the abdomen and pelvis were obtained from the dome of the diaphragm to the proximal thighs.    100cc omnipague 350 IV contrast was given    COMPARISON:  None available.    FINDINGS:  Lower Thorax:    The lung bases are clear. The heart  is normal in size.    CT Abdomen:    Liver: The liver is normal in size and imaging appearance.    Gallbladder: The gallbladder is within normal limits.    Biliary Tree: No intra or extrahepatic ductal dilation.    Spleen: Normal.    Pancreas: The pancreas is normal.    Adrenal Glands: Normal    Kidneys: The kidneys are normal in imaging appearance without hydronephrosis or hydroureter.    Vasculature: The aorta is normal in course and caliber.    Lymph nodes: No abdominal lymphadenopathy is seen.    Intraperitoneal structures: There is no ascites.    Bowel: Stomach is normal.  There are fluid-filled loops of small bowel and colon which are not dilated or thickened.  Findings may be secondary to enteritis.    Abdominal wall: The abdominal wall and musculature are normal.    Musculoskeletal: Normal.    CT Pelvis:    Bladder: Normal.    Reproductive Organs: There is a 4.0 x 5.3 cm intramural fibroid.  The ovaries are normal.    Pelvic Lymph nodes: No pelvic lymphadenopathy or pelvic mass is identified.                                       X-Ray Chest PA And Lateral (Final result)  Result time 04/10/25 12:25:22   Procedure changed from X-Ray Chest AP Portable     Final result by Juan M Starkey MD (04/10/25 12:25:22)                   Impression:      1. No acute process.      Electronically signed by: Juan M Starkey  Date:    04/10/2025  Time:    12:25               Narrative:    EXAMINATION:  XR CHEST PA AND LATERAL    CLINICAL HISTORY:  Cough;    COMPARISON:  March 2021    FINDINGS:  PA and lateral views demonstrate no cardiac, pulmonary, or acute osseous abnormalities.                                       Medications   magnesium sulfate 2g in water 50mL IVPB (premix) (has no administration in time range)   potassium bicarbonate disintegrating tablet 25 mEq (has no administration in time range)   0.9% NaCl infusion (has no administration in time range)   hyoscyamine injection 0.125 mg (0.125 mg Intravenous  Given 4/10/25 1256)   ondansetron injection 4 mg (4 mg Intravenous Given 4/10/25 1255)   sodium chloride 0.9% bolus 1,000 mL 1,000 mL (0 mLs Intravenous Stopped 4/10/25 1335)   iohexoL (OMNIPAQUE 350) injection 100 mL (100 mLs Intravenous Given 4/10/25 1431)   hyoscyamine injection 0.125 mg (0.125 mg Intravenous Given 4/10/25 1537)     Medical Decision Making  Patient presents with fever nausea vomiting diarrhea.  Differential diagnosis includes colitis, gastroenteritis, C diff colitis, urinary tract infection.  Here CBC with hemoglobin 8.6, MCV 60.  Iron studies consistent with iron-deficiency anemia.  CMP with potassium 3.4.  EKG sinus rhythm  Chest x-ray no infiltrates.  CT with likely enteritis.  Here patient received Zofran Levsin 1 L normal saline.  She is resting in bed still with some abdominal cramping.  Left-sided repeated.  She appears comfortable with continued tachycardia up to 120 why arrest.  This is likely complicated by patient's iron-deficiency anemia.  Will continue hydration.  Given significant symptoms with ongoing diarrhea feel patient will need IV hydration overnight in the hospital.  Adrianne with Hospital Medicine to evaluate for possible admission.    Amount and/or Complexity of Data Reviewed  Labs: ordered. Decision-making details documented in ED Course.  Radiology: ordered. Decision-making details documented in ED Course.  ECG/medicine tests:  Decision-making details documented in ED Course.    Risk  Prescription drug management.                                      Clinical Impression:  Final diagnoses:  [R00.0] Tachycardia  [E86.9] Volume depletion (Primary)  [D50.9] Iron deficiency anemia, unspecified iron deficiency anemia type  [K52.9] Gastroenteritis          ED Disposition Condition    Admit Stable                    [1]   Social History  Tobacco Use    Smoking status: Never    Smokeless tobacco: Never   Substance Use Topics    Alcohol use: Never        Dar Felipe,  MD  04/10/25 [id number]

## 2025-04-10 NOTE — ASSESSMENT & PLAN NOTE
Anemia is likely due to Iron deficiency. Most recent hemoglobin and hematocrit are listed below.  Recent Labs     04/10/25  1247   HGB 8.7*   HCT 29.9*     Plan  - Monitor serial CBC: Daily  - Transfuse PRBC if patient becomes hemodynamically unstable, symptomatic or H/H drops below 7/21.

## 2025-04-10 NOTE — Clinical Note
Diagnosis: Volume depletion [430268]   Future Attending Provider: LEILA TALLEY [[id number]]   Place in Observation: [facility] [1565]

## 2025-04-10 NOTE — ASSESSMENT & PLAN NOTE
Patient's most recent potassium results are listed below.   Recent Labs     04/10/25  1247   K 3.4*     Plan  - Replete potassium per protocol  - Monitor potassium Daily  - Patient's hypokalemia is worsening. Will continue current treatment

## 2025-04-10 NOTE — HPI
29-year-old female with no significant medical history presents to the emergency room for vomiting and diarrhea for 6 days, with abdominal pain, fever of 101, body aches, nausea, and diarrhea.    In the ER, CBC with hemoglobin 8.6 and MCV 60 iron studies consistent with iron-deficiency anemia, CMP with potassium 3.4, Mag 1.5 EKG with normal sinus rhythm, C diff negative, influenza negative COVID negative chest x-ray with no infiltrates or consolidation.  Cat scan of abdomen likely enteritis.  Stool culture sent positive for Astrovirus and Sapovirus   Magnesium and potassium replaced in ER. Levsin given, IV NS bolus given in ER.     Admit to hospital medicine for gastroenteritis, electrolyte imbalance.

## 2025-04-10 NOTE — SUBJECTIVE & OBJECTIVE
No past medical history on file.    No past surgical history on file.    Review of patient's allergies indicates:  No Known Allergies    No current facility-administered medications on file prior to encounter.     Current Outpatient Medications on File Prior to Encounter   Medication Sig    [DISCONTINUED] ketorolac (TORADOL) 10 mg tablet Take 1 tablet (10 mg total) by mouth every 6 (six) hours.     Family History    None       Tobacco Use    Smoking status: Never    Smokeless tobacco: Never   Substance and Sexual Activity    Alcohol use: Never    Drug use: Not on file    Sexual activity: Not on file     Review of Systems   Constitutional:  Positive for appetite change, chills and fever.   HENT: Negative.     Respiratory:  Positive for cough.    Cardiovascular: Negative.    Gastrointestinal:  Positive for abdominal pain, diarrhea, nausea and vomiting. Negative for blood in stool and constipation.   Genitourinary: Negative.    Musculoskeletal: Negative.    Skin: Negative.    Neurological: Negative.    Psychiatric/Behavioral: Negative.       Objective:     Vital Signs (Most Recent):  Temp: 98.5 °F (36.9 °C) (04/10/25 1152)  Pulse: 105 (04/10/25 1632)  Resp: 16 (04/10/25 1632)  BP: 130/60 (04/10/25 1632)  SpO2: 100 % (04/10/25 1632) Vital Signs (24h Range):  Temp:  [98.5 °F (36.9 °C)] 98.5 °F (36.9 °C)  Pulse:  [105-112] 105  Resp:  [16-18] 16  SpO2:  [98 %-100 %] 100 %  BP: (107-130)/(60-77) 130/60     Weight: 74.8 kg (165 lb)  Body mass index is 28.32 kg/m².     Physical Exam  Vitals reviewed.   Constitutional:       General: She is not in acute distress.     Appearance: Normal appearance. She is not ill-appearing.   HENT:      Head: Normocephalic and atraumatic.      Mouth/Throat:      Mouth: Mucous membranes are dry.   Eyes:      Extraocular Movements: Extraocular movements intact.      Pupils: Pupils are equal, round, and reactive to light.   Cardiovascular:      Rate and Rhythm: Tachycardia present.       Pulses: Normal pulses.   Pulmonary:      Effort: Pulmonary effort is normal.      Breath sounds: No wheezing.   Abdominal:      Comments: Diffuse abdominal tenderness.    Musculoskeletal:         General: Normal range of motion.      Cervical back: Neck supple.   Skin:     General: Skin is warm.      Capillary Refill: Capillary refill takes 2 to 3 seconds.   Neurological:      General: No focal deficit present.      Mental Status: She is alert. Mental status is at baseline.   Psychiatric:         Mood and Affect: Mood normal.          CRANIAL NERVES     CN III, IV, VI   Pupils are equal, round, and reactive to light.     Significant Labs: All pertinent labs within the past 24 hours have been reviewed.  CBC:   Recent Labs   Lab 04/10/25  1247   WBC 8.60   HGB 8.7*   HCT 29.9*   *     CMP:   Recent Labs   Lab 04/10/25  1247      K 3.4*   CO2 25   BUN 11   CREATININE 0.7   CALCIUM 8.9   ALBUMIN 4.1   BILITOT 0.4   ALKPHOS 61   AST 14   ALT 10     Magnesium:   Recent Labs   Lab 04/10/25  1247   MG 1.5*     TSH:   Recent Labs   Lab 04/10/25  1247   TSH 1.116     Urine Studies:   Recent Labs   Lab 04/10/25  1150   APPEARANCEUA Cloudy*   SPECGRAV 1.020   PROTEINUA Trace*   BILIRUBINUA Negative   UROBILINOGEN Negative   LEUKOCYTESUR Trace*   RBCUA 1   WBCUA 6*   BACTERIA Moderate*       Significant Imaging: I have reviewed all pertinent imaging results/findings within the past 24 hours.  Imaging Results              CT Abdomen Pelvis With IV Contrast NO Oral Contrast (Final result)  Result time 04/10/25 14:39:56      Final result by Darlene Eemry MD (04/10/25 14:39:56)                   Impression:      Fluid-filled loops of small bowel and colon without distension or wall thickening.  Findings may be secondary to enteritis.    4.0 x 5.3 cm intramural fibroid      Electronically signed by: Darlene Emery  Date:    04/10/2025  Time:    14:39               Narrative:      CMS MANDATED QUALITY DATA - CT  RADIATION - 436    All CT scans at this facility utilize dose modulation, iterative reconstruction, and/or weight based dosing when appropriate to reduce radiation dose to as low as reasonably achievable.    CLINICAL HISTORY:  ([id number])28 y/o  (1995) F    Abdominal abscess/infection suspected;    TECHNIQUE:  (CHRIS#[id number], exam time 4/10/2025 14:31)    CT ABDOMEN PELVIS WITH IV CONTRAST [id number]    Axial CT images of the abdomen and pelvis were obtained from the dome of the diaphragm to the proximal thighs.    100cc omnipague 350 IV contrast was given    COMPARISON:  None available.    FINDINGS:  Lower Thorax:    The lung bases are clear. The heart is normal in size.    CT Abdomen:    Liver: The liver is normal in size and imaging appearance.    Gallbladder: The gallbladder is within normal limits.    Biliary Tree: No intra or extrahepatic ductal dilation.    Spleen: Normal.    Pancreas: The pancreas is normal.    Adrenal Glands: Normal    Kidneys: The kidneys are normal in imaging appearance without hydronephrosis or hydroureter.    Vasculature: The aorta is normal in course and caliber.    Lymph nodes: No abdominal lymphadenopathy is seen.    Intraperitoneal structures: There is no ascites.    Bowel: Stomach is normal.  There are fluid-filled loops of small bowel and colon which are not dilated or thickened.  Findings may be secondary to enteritis.    Abdominal wall: The abdominal wall and musculature are normal.    Musculoskeletal: Normal.    CT Pelvis:    Bladder: Normal.    Reproductive Organs: There is a 4.0 x 5.3 cm intramural fibroid.  The ovaries are normal.    Pelvic Lymph nodes: No pelvic lymphadenopathy or pelvic mass is identified.                                       X-Ray Chest PA And Lateral (Final result)  Result time 04/10/25 12:25:22   Procedure changed from X-Ray Chest AP Portable     Final result by Juan M Starkey MD (04/10/25 12:25:22)                   Impression:      1. No acute  process.      Electronically signed by: Juan M Starkey  Date:    04/10/2025  Time:    12:25               Narrative:    EXAMINATION:  XR CHEST PA AND LATERAL    CLINICAL HISTORY:  Cough;    COMPARISON:  March 2021    FINDINGS:  PA and lateral views demonstrate no cardiac, pulmonary, or acute osseous abnormalities.

## 2025-04-10 NOTE — ASSESSMENT & PLAN NOTE
+ Sapovirus and astrovirus, contact precautions  CT abdomen with enteritis  Treat symptomatically  [place] diet.

## 2025-04-11 LAB
ABSOLUTE EOSINOPHIL (SMH): 0.14 K/UL
ABSOLUTE MONOCYTE (SMH): 0.38 K/UL (ref 0.3–1)
ABSOLUTE NEUTROPHIL COUNT (SMH): 6.2 K/UL (ref 1.8–7.7)
ALBUMIN SERPL-MCNC: 3.1 G/DL (ref 3.5–5.2)
ALP SERPL-CCNC: 54 UNIT/L (ref 40–150)
ALT SERPL-CCNC: 10 UNIT/L (ref 10–44)
ANION GAP (SMH): 6 MMOL/L (ref 8–16)
AST SERPL-CCNC: 17 UNIT/L (ref 11–45)
BASOPHILS # BLD AUTO: 0.01 K/UL
BASOPHILS NFR BLD AUTO: 0.1 %
BILIRUB SERPL-MCNC: 0.6 MG/DL (ref 0.1–1)
BUN SERPL-MCNC: 13 MG/DL (ref 6–20)
CALCIUM SERPL-MCNC: 7.8 MG/DL (ref 8.7–10.5)
CHLORIDE SERPL-SCNC: 109 MMOL/L (ref 95–110)
CO2 SERPL-SCNC: 21 MMOL/L (ref 23–29)
CREAT SERPL-MCNC: 0.6 MG/DL (ref 0.5–1.4)
ERYTHROCYTE [DISTWIDTH] IN BLOOD BY AUTOMATED COUNT: 17.6 % (ref 11.5–14.5)
GFR SERPLBLD CREATININE-BSD FMLA CKD-EPI: >60 ML/MIN/1.73/M2
GLUCOSE SERPL-MCNC: 94 MG/DL (ref 70–110)
HCT VFR BLD AUTO: 27.9 % (ref 37–48.5)
HGB BLD-MCNC: 8.1 GM/DL (ref 12–16)
IMM GRANULOCYTES # BLD AUTO: 0.03 K/UL (ref 0–0.04)
IMM GRANULOCYTES NFR BLD AUTO: 0.4 % (ref 0–0.5)
LACTATE SERPL-SCNC: 1.2 MMOL/L (ref 0.5–2.2)
LYMPHOCYTES # BLD AUTO: 0.62 K/UL (ref 1–4.8)
MAGNESIUM SERPL-MCNC: 1.8 MG/DL (ref 1.6–2.6)
MCH RBC QN AUTO: 19.7 PG (ref 27–31)
MCHC RBC AUTO-ENTMCNC: 29 G/DL (ref 32–36)
MCV RBC AUTO: 68 FL (ref 82–98)
NUCLEATED RBC (/100WBC) (SMH): 0 /100 WBC
PLATELET # BLD AUTO: 594 K/UL (ref 150–450)
PMV BLD AUTO: 8.3 FL (ref 9.2–12.9)
POTASSIUM SERPL-SCNC: 3.6 MMOL/L (ref 3.5–5.1)
PROCALCITONIN SERPL-MCNC: 0.02 NG/ML
PROT SERPL-MCNC: 6 GM/DL (ref 6–8.4)
RBC # BLD AUTO: 4.12 M/UL (ref 4–5.4)
RELATIVE EOSINOPHIL (SMH): 1.9 % (ref 0–8)
RELATIVE LYMPHOCYTE (SMH): 8.4 % (ref 18–48)
RELATIVE MONOCYTE (SMH): 5.2 % (ref 4–15)
RELATIVE NEUTROPHIL (SMH): 84 % (ref 38–73)
SODIUM SERPL-SCNC: 136 MMOL/L (ref 136–145)
WBC # BLD AUTO: 7.36 K/UL (ref 3.9–12.7)

## 2025-04-11 PROCEDURE — 80053 COMPREHEN METABOLIC PANEL: CPT | Performed by: NURSE PRACTITIONER

## 2025-04-11 PROCEDURE — G0378 HOSPITAL OBSERVATION PER HR: HCPCS

## 2025-04-11 PROCEDURE — 87086 URINE CULTURE/COLONY COUNT: CPT | Performed by: INTERNAL MEDICINE

## 2025-04-11 PROCEDURE — 96366 THER/PROPH/DIAG IV INF ADDON: CPT

## 2025-04-11 PROCEDURE — 96361 HYDRATE IV INFUSION ADD-ON: CPT

## 2025-04-11 PROCEDURE — 84145 PROCALCITONIN (PCT): CPT | Performed by: INTERNAL MEDICINE

## 2025-04-11 PROCEDURE — 83605 ASSAY OF LACTIC ACID: CPT | Performed by: INTERNAL MEDICINE

## 2025-04-11 PROCEDURE — 63600175 PHARM REV CODE 636 W HCPCS: Performed by: NURSE PRACTITIONER

## 2025-04-11 PROCEDURE — 25000003 PHARM REV CODE 250: Performed by: NURSE PRACTITIONER

## 2025-04-11 PROCEDURE — 96376 TX/PRO/DX INJ SAME DRUG ADON: CPT

## 2025-04-11 PROCEDURE — 85025 COMPLETE CBC W/AUTO DIFF WBC: CPT | Performed by: NURSE PRACTITIONER

## 2025-04-11 PROCEDURE — 25000003 PHARM REV CODE 250: Performed by: INTERNAL MEDICINE

## 2025-04-11 PROCEDURE — 36415 COLL VENOUS BLD VENIPUNCTURE: CPT | Performed by: INTERNAL MEDICINE

## 2025-04-11 PROCEDURE — 83735 ASSAY OF MAGNESIUM: CPT | Performed by: NURSE PRACTITIONER

## 2025-04-11 PROCEDURE — 63600175 PHARM REV CODE 636 W HCPCS: Performed by: INTERNAL MEDICINE

## 2025-04-11 RX ORDER — LANOLIN ALCOHOL/MO/W.PET/CERES
1 CREAM (GRAM) TOPICAL DAILY
Status: DISCONTINUED | OUTPATIENT
Start: 2025-04-11 | End: 2025-04-12 | Stop reason: HOSPADM

## 2025-04-11 RX ADMIN — SODIUM CHLORIDE: 9 INJECTION, SOLUTION INTRAVENOUS at 11:04

## 2025-04-11 RX ADMIN — SODIUM CHLORIDE: 9 INJECTION, SOLUTION INTRAVENOUS at 12:04

## 2025-04-11 RX ADMIN — ONDANSETRON 4 MG: 2 INJECTION INTRAMUSCULAR; INTRAVENOUS at 05:04

## 2025-04-11 RX ADMIN — FERROUS SULFATE TAB 325 MG (65 MG ELEMENTAL FE) 1 EACH: 325 (65 FE) TAB at 11:04

## 2025-04-11 RX ADMIN — Medication 800 MG: at 06:04

## 2025-04-11 RX ADMIN — FAMOTIDINE 20 MG: 20 TABLET, FILM COATED ORAL at 08:04

## 2025-04-11 RX ADMIN — SODIUM CHLORIDE: 9 INJECTION, SOLUTION INTRAVENOUS at 04:04

## 2025-04-11 RX ADMIN — POTASSIUM BICARBONATE 50 MEQ: 977.5 TABLET, EFFERVESCENT ORAL at 06:04

## 2025-04-11 RX ADMIN — ACETAMINOPHEN 650 MG: 325 TABLET ORAL at 11:04

## 2025-04-11 RX ADMIN — Medication 800 MG: at 09:04

## 2025-04-11 RX ADMIN — PIPERACILLIN AND TAZOBACTAM 4.5 G: 4; .5 INJECTION, POWDER, LYOPHILIZED, FOR SOLUTION INTRAVENOUS; PARENTERAL at 06:04

## 2025-04-11 RX ADMIN — FAMOTIDINE 20 MG: 20 TABLET, FILM COATED ORAL at 09:04

## 2025-04-11 NOTE — ASSESSMENT & PLAN NOTE
Patient has Abnormal Magnesium: hypomagnesemia. Will continue to monitor electrolytes closely. Will replace the affected electrolytes and repeat labs to be done after interventions completed. The patient's magnesium results have been reviewed and are listed below.  Recent Labs   Lab 04/11/25  0417   MG 1.8

## 2025-04-11 NOTE — HOSPITAL COURSE
Patient monitored closely during hospitalization.  She was admitted with gastroenteritis.  She was found to be positive for astrovirus and sapovirus on stool sample.  C. Diff negative.  Blood cultures with no growth to date.  CT abd/pel with enteritis.  She was given IV fluid hydration.  Electrolytes were replaced.  Patient had improvement in her symptoms.  She was seen and examined on day of discharge and deemed stable for discharge home.  Return precautions given.  Patient verbalizes understanding and is agreeable to discharge plan.  She is to follow up with her PCP in 1 week.

## 2025-04-11 NOTE — PLAN OF CARE
[facility] - Med/Surg  Initial Discharge Assessment       Primary Care Provider: Nimco Underwood NP    Assessment completed at bedside with patient. Patient is independent at baseline, lives with her parents. No HH/HD/DME/Coumadin.  PCP is Nimco Underwood NP and pharmacy is [facility] [address]       Admission Diagnosis: Volume depletion [E86.9]    Admission Date: 4/10/2025  Expected Discharge Date: 4/12/2025    Transition of Care Barriers: None    Payor: [employer] / Plan: [facility] EXCHANGE PLAN / Product Type: Commercial /     Extended Emergency Contact Information  Primary Emergency Contact: Donita Elizondo  Mobile Phone: [phone]  Relation: Mother   needed? No    Discharge Plan A: Home with family  Discharge Plan B: Home      [id number] - [place] - [address]  [address]  [place] [address]  Phone: [phone] Fax: [phone]      Initial Assessment (most recent)       Adult Discharge Assessment - 04/11/25 1150          Discharge Assessment    Assessment Type Discharge Planning Assessment     Confirmed/corrected address, phone number and insurance Yes     Confirmed Demographics Correct on Facesheet     Source of Information patient     When was your last doctors appointment? 03/31/25     Does patient/caregiver understand observation status Yes     Reason For Admission gastroenteritis     People in Home parent(s)     Facility Arrived From: home     Do you expect to return to your current living situation? Yes     Do you have help at home or someone to help you manage your care at home? Yes     Who are your caregiver(s) and their phone number(s)? Mother Donita     Prior to hospitilization cognitive status: Alert/Oriented     Current cognitive status: Alert/Oriented     Walking or Climbing Stairs Difficulty no     Dressing/Bathing Difficulty no     Equipment Currently Used at Home none     Readmission within  30 days? No     Patient currently being followed by outpatient case management? No     Do you currently have service(s) that help you manage your care at home? No     Do you take prescription medications? Yes     Do you have prescription coverage? Yes     Do you have any problems affording any of your prescribed medications? No     Is the patient taking medications as prescribed? yes     Who is going to help you get home at discharge? Parents.     How do you get to doctors appointments? family or friend will provide     Are you on dialysis? No     Do you take coumadin? No     Discharge Plan A Home with family     Discharge Plan B Home     DME Needed Upon Discharge  none     Discharge Plan discussed with: Patient     Transition of Care Barriers None

## 2025-04-11 NOTE — ASSESSMENT & PLAN NOTE
Anemia is likely due to Iron deficiency. Most recent hemoglobin and hematocrit are listed below.  Recent Labs     04/10/25  1247 04/11/25  0417   HGB 8.7* 8.1*   HCT 29.9* 27.9*     Plan  - Monitor serial CBC: Daily  - Transfuse PRBC if patient becomes hemodynamically unstable, symptomatic or H/H drops below 7/21.  - add daily iron supplement

## 2025-04-11 NOTE — PROGRESS NOTES
[facility] Medicine  Progress Note    Patient Name: Marva Elizondo  MRN: [id number]  Patient Class: OP- Observation   Admission Date: 4/10/2025  Length of Stay: 0 days  Attending Physician: Kenzie Crawford MD  Primary Care Provider: Nimco Underwood NP        Subjective     Principal Problem:Gastroenteritis        HPI:  29-year-old female with no significant medical history presents to the emergency room for vomiting and diarrhea for 6 days, with abdominal pain, fever of 101, body aches, nausea, and diarrhea.    In the ER, CBC with hemoglobin 8.6 and MCV 60 iron studies consistent with iron-deficiency anemia, CMP with potassium 3.4, Mag 1.5 EKG with normal sinus rhythm, C diff negative, influenza negative COVID negative chest x-ray with no infiltrates or consolidation.  Cat scan of abdomen likely enteritis.  Stool culture sent positive for Astrovirus and Sapovirus   Magnesium and potassium replaced in ER. Levsin given, IV NS bolus given in ER.     Admit to hospital medicine for gastroenteritis, electrolyte imbalance.     Overview/Hospital Course:  Patient monitored closely during hospitalization.  She was admitted with gastroenteritis.  She was found to be positive for astrovirus and sapovirus on stool sample.  C. Diff negative.  CT abd/pel with enteritis.  She was given IV fluid hydration.  Electrolytes were replaced.     Interval History: Patient seen and examined at bedside.  NAD.  She continues with watery diarrhea although states that she is able to better control it now and becoming less frequent.  She has some nausea but denies vomiting.  No abd pain.  Tolerating diet.  Feels lightheaded when standing up.   Recommend continue IV hydration another day.   D/C zosyn as symptoms viral related to astrovirus/sapovirus     Review of Systems   Constitutional:  Positive for fever.   Respiratory:  Negative for cough and shortness of breath.    Cardiovascular:  Negative for chest  pain.   Gastrointestinal:  Positive for diarrhea and nausea. Negative for abdominal pain and vomiting.   Neurological:  Positive for light-headedness.     Objective:     Vital Signs (Most Recent):  Temp: 99 °F (37.2 °C) (04/11/25 1224)  Pulse: 107 (04/11/25 1224)  Resp: 18 (04/11/25 1224)  BP: 111/64 (04/11/25 1224)  SpO2: 100 % (04/11/25 1224) Vital Signs (24h Range):  Temp:  [97.8 °F (36.6 °C)-102.6 °F (39.2 °C)] 99 °F (37.2 °C)  Pulse:  [] 107  Resp:  [15-18] 18  SpO2:  [95 %-100 %] 100 %  BP: (101-130)/(57-71) 111/64     Weight: 77.4 kg (170 lb 10.2 oz)  Body mass index is 29.29 kg/m².    Intake/Output Summary (Last 24 hours) at 4/11/2025 1513  Last data filed at 4/10/2025 2145  Gross per 24 hour   Intake 729.17 ml   Output --   Net 729.17 ml         Physical Exam  Vitals and nursing note reviewed.   Constitutional:       General: She is not in acute distress.  Cardiovascular:      Rate and Rhythm: Normal rate and regular rhythm.   Pulmonary:      Effort: Pulmonary effort is normal. No respiratory distress.      Breath sounds: Normal breath sounds.   Abdominal:      Palpations: Abdomen is soft.      Tenderness: There is no abdominal tenderness.   Musculoskeletal:      Right lower leg: No edema.      Left lower leg: No edema.   Skin:     General: Skin is warm and dry.   Neurological:      Mental Status: She is alert and oriented to person, place, and time.               Significant Labs: All pertinent labs within the past 24 hours have been reviewed.  CBC:   Recent Labs   Lab 04/10/25  1247 04/11/25  0417   WBC 8.60 7.36   HGB 8.7* 8.1*   HCT 29.9* 27.9*   * 594*     CMP:   Recent Labs   Lab 04/10/25  1247 04/11/25  0417    136   K 3.4* 3.6   CO2 25 21*   BUN 11 13   CREATININE 0.7 0.6   CALCIUM 8.9 7.8*   ALBUMIN 4.1 3.1*   BILITOT 0.4 0.6   ALKPHOS 61 54   AST 14 17   ALT 10 10     Magnesium:   Recent Labs   Lab 04/10/25  1247 04/11/25  0417   MG 1.5* 1.8       Significant Imaging: I have  reviewed all pertinent imaging results/findings within the past 24 hours.      Assessment & Plan  Gastroenteritis  + Sapovirus and astrovirus, contact precautions  CT abdomen with enteritis  Treat symptomatically  [place] diet.   IV hydration     Volume depletion  Secondary to diarrhea/viral gastroenteritis   IV hydration   Diet as tolerated     Hypokalemia  Patient's most recent potassium results are listed below.   Recent Labs     04/10/25  1247 04/11/25  0417   K 3.4* 3.6     Plan  - Replete potassium per protocol  - Monitor potassium Daily  - Patient's hypokalemia is improving    Hypomagnesemia  Patient has Abnormal Magnesium: hypomagnesemia. Will continue to monitor electrolytes closely. Will replace the affected electrolytes and repeat labs to be done after interventions completed. The patient's magnesium results have been reviewed and are listed below.  Recent Labs   Lab 04/11/25  0417   MG 1.8      Anemia  Anemia is likely due to Iron deficiency. Most recent hemoglobin and hematocrit are listed below.  Recent Labs     04/10/25  1247 04/11/25  0417   HGB 8.7* 8.1*   HCT 29.9* 27.9*     Plan  - Monitor serial CBC: Daily  - Transfuse PRBC if patient becomes hemodynamically unstable, symptomatic or H/H drops below 7/21.  - add daily iron supplement     VTE Risk Mitigation (From admission, onward)           Ordered     IP VTE LOW RISK PATIENT  Once         04/10/25 1633     Place sequential compression device  Until discontinued         04/10/25 1633                    Discharge Planning   BOBBY: 4/12/2025     Code Status: Full Code   Medical Readiness for Discharge Date:   Discharge Plan A: Home with family                        Hawa Marley NP  Department of Hospital Medicine   [facility]/Surg

## 2025-04-11 NOTE — ASSESSMENT & PLAN NOTE
+ Sapovirus and astrovirus, contact precautions  CT abdomen with enteritis  Treat symptomatically  [place] diet.   IV hydration

## 2025-04-11 NOTE — PLAN OF CARE
Plan of care reviewed with patient. Patient verbalized complete understanding. Two hour patient rounding maintained throughout shift. All fall precautions maintained. Bed in lowest position, locked, call light in reach. Side rails up x 2. Slip resistant socks maintained. Needs attended to.         Problem: Adult Inpatient Plan of Care  Goal: Plan of Care Review  Outcome: Progressing  Goal: Patient-Specific Goal (Individualized)  Outcome: Progressing  Goal: Absence of Hospital-Acquired Illness or Injury  Outcome: Progressing  Goal: Optimal Comfort and Wellbeing  Outcome: Progressing  Goal: Readiness for Transition of Care  Outcome: Progressing     Problem: Infection  Goal: Absence of Infection Signs and Symptoms  Outcome: Progressing

## 2025-04-11 NOTE — PROGRESS NOTES
"Pharmacist Renal Dose Adjustment Note    Marva Elizondo is a 29 y.o. female being treated with the medication Piperacillin-tazobactam    Patient Data:    Vital Signs (Most Recent):  Temp: (!) 102.6 °F (39.2 °C) (04/10/25 2135)  Pulse: 105 (04/10/25 1632)  Resp: 16 (04/10/25 1632)  BP: 130/60 (04/10/25 1632)  SpO2: 100 % (04/10/25 1632) Vital Signs (72h Range):  Temp:  [98.5 °F (36.9 °C)-102.6 °F (39.2 °C)]   Pulse:  [105-112]   Resp:  [16-18]   BP: (107-130)/(60-77)   SpO2:  [98 %-100 %]        Ht: 5' 4" (1.626 m)  Wt: 74.8 kg (165 lb)  Estimated Creatinine Clearance: 118.4 mL/min (based on SCr of 0.7 mg/dL).  Body mass index is 28.32 kg/m².    Per [facility] renal dosing protocol:     Previous Order: Piperacillin-tazobactam 3.375 g Q8H    Will be changed to:     New Order: Piperacillin-tazobactam 4.5 g Q8H,    Due to: Per Pharmacy Protocol    Renal dose adjustments performed as noted above.    We will continue monitoring and adjusting as necessary.    Pharmacist: Ave SandersD  Ext: [phone]      "

## 2025-04-11 NOTE — ASSESSMENT & PLAN NOTE
Patient's most recent potassium results are listed below.   Recent Labs     04/10/25  1247 04/11/25  0417   K 3.4* 3.6     Plan  - Replete potassium per protocol  - Monitor potassium Daily  - Patient's hypokalemia is improving

## 2025-04-11 NOTE — SUBJECTIVE & OBJECTIVE
Interval History: Patient seen and examined at bedside.  NAD.  She continues with watery diarrhea although states that she is able to better control it now and becoming less frequent.  She has some nausea but denies vomiting.  No abd pain.  Tolerating diet.  Feels lightheaded when standing up.   Recommend continue IV hydration another day.   D/C zosyn as symptoms viral related to astrovirus/sapovirus     Review of Systems   Constitutional:  Positive for fever.   Respiratory:  Negative for cough and shortness of breath.    Cardiovascular:  Negative for chest pain.   Gastrointestinal:  Positive for diarrhea and nausea. Negative for abdominal pain and vomiting.   Neurological:  Positive for light-headedness.     Objective:     Vital Signs (Most Recent):  Temp: 99 °F (37.2 °C) (04/11/25 1224)  Pulse: 107 (04/11/25 1224)  Resp: 18 (04/11/25 1224)  BP: 111/64 (04/11/25 1224)  SpO2: 100 % (04/11/25 1224) Vital Signs (24h Range):  Temp:  [97.8 °F (36.6 °C)-102.6 °F (39.2 °C)] 99 °F (37.2 °C)  Pulse:  [] 107  Resp:  [15-18] 18  SpO2:  [95 %-100 %] 100 %  BP: (101-130)/(57-71) 111/64     Weight: 77.4 kg (170 lb 10.2 oz)  Body mass index is 29.29 kg/m².    Intake/Output Summary (Last 24 hours) at 4/11/2025 1513  Last data filed at 4/10/2025 2145  Gross per 24 hour   Intake 729.17 ml   Output --   Net 729.17 ml         Physical Exam  Vitals and nursing note reviewed.   Constitutional:       General: She is not in acute distress.  Cardiovascular:      Rate and Rhythm: Normal rate and regular rhythm.   Pulmonary:      Effort: Pulmonary effort is normal. No respiratory distress.      Breath sounds: Normal breath sounds.   Abdominal:      Palpations: Abdomen is soft.      Tenderness: There is no abdominal tenderness.   Musculoskeletal:      Right lower leg: No edema.      Left lower leg: No edema.   Skin:     General: Skin is warm and dry.   Neurological:      Mental Status: She is alert and oriented to person, place, and  time.               Significant Labs: All pertinent labs within the past 24 hours have been reviewed.  CBC:   Recent Labs   Lab 04/10/25  1247 04/11/25  0417   WBC 8.60 7.36   HGB 8.7* 8.1*   HCT 29.9* 27.9*   * 594*     CMP:   Recent Labs   Lab 04/10/25  1247 04/11/25  0417    136   K 3.4* 3.6   CO2 25 21*   BUN 11 13   CREATININE 0.7 0.6   CALCIUM 8.9 7.8*   ALBUMIN 4.1 3.1*   BILITOT 0.4 0.6   ALKPHOS 61 54   AST 14 17   ALT 10 10     Magnesium:   Recent Labs   Lab 04/10/25  1247 04/11/25  0417   MG 1.5* 1.8       Significant Imaging: I have reviewed all pertinent imaging results/findings within the past 24 hours.

## 2025-04-12 VITALS
WEIGHT: 170.63 LBS | SYSTOLIC BLOOD PRESSURE: 104 MMHG | RESPIRATION RATE: 16 BRPM | DIASTOLIC BLOOD PRESSURE: 60 MMHG | OXYGEN SATURATION: 100 % | HEIGHT: 64 IN | TEMPERATURE: 98 F | HEART RATE: 93 BPM | BODY MASS INDEX: 29.13 KG/M2

## 2025-04-12 LAB
ALBUMIN SERPL-MCNC: 2.9 G/DL (ref 3.5–5.2)
ALP SERPL-CCNC: 49 UNIT/L (ref 40–150)
ALT SERPL-CCNC: 8 UNIT/L (ref 10–44)
ANION GAP (SMH): 4 MMOL/L (ref 8–16)
ANISOCYTOSIS BLD QL SMEAR: SLIGHT
AST SERPL-CCNC: 23 UNIT/L (ref 11–45)
BILIRUB SERPL-MCNC: 0.2 MG/DL (ref 0.1–1)
BUN SERPL-MCNC: 8 MG/DL (ref 6–20)
CALCIUM SERPL-MCNC: 8.1 MG/DL (ref 8.7–10.5)
CHLORIDE SERPL-SCNC: 113 MMOL/L (ref 95–110)
CO2 SERPL-SCNC: 18 MMOL/L (ref 23–29)
CREAT SERPL-MCNC: 0.6 MG/DL (ref 0.5–1.4)
EOSINOPHIL NFR BLD MANUAL: 3 % (ref 0–8)
ERYTHROCYTE [DISTWIDTH] IN BLOOD BY AUTOMATED COUNT: 18 % (ref 11.5–14.5)
GFR SERPLBLD CREATININE-BSD FMLA CKD-EPI: >60 ML/MIN/1.73/M2
GLUCOSE SERPL-MCNC: 79 MG/DL (ref 70–110)
HCT VFR BLD AUTO: 27.9 % (ref 37–48.5)
HGB BLD-MCNC: 7.9 GM/DL (ref 12–16)
LYMPHOCYTES NFR BLD MANUAL: 27 % (ref 18–48)
MAGNESIUM SERPL-MCNC: 1.8 MG/DL (ref 1.6–2.6)
MCH RBC QN AUTO: 19.4 PG (ref 27–31)
MCHC RBC AUTO-ENTMCNC: 28.3 G/DL (ref 32–36)
MCV RBC AUTO: 68 FL (ref 82–98)
MONOCYTES NFR BLD MANUAL: 5 % (ref 4–15)
NEUTROPHILS NFR BLD MANUAL: 61 % (ref 38–73)
NEUTS BAND NFR BLD MANUAL: 4 %
NUCLEATED RBC (/100WBC) (SMH): 0 /100 WBC
PLATELET # BLD AUTO: 585 K/UL (ref 150–450)
PLATELET BLD QL SMEAR: NORMAL
PMV BLD AUTO: 8.4 FL (ref 9.2–12.9)
POTASSIUM SERPL-SCNC: 3.9 MMOL/L (ref 3.5–5.1)
PROT SERPL-MCNC: 6.2 GM/DL (ref 6–8.4)
RBC # BLD AUTO: 4.08 M/UL (ref 4–5.4)
SODIUM SERPL-SCNC: 135 MMOL/L (ref 136–145)
WBC # BLD AUTO: 5.49 K/UL (ref 3.9–12.7)

## 2025-04-12 PROCEDURE — 82947 ASSAY GLUCOSE BLOOD QUANT: CPT | Performed by: NURSE PRACTITIONER

## 2025-04-12 PROCEDURE — 83735 ASSAY OF MAGNESIUM: CPT | Performed by: NURSE PRACTITIONER

## 2025-04-12 PROCEDURE — 25000003 PHARM REV CODE 250: Performed by: NURSE PRACTITIONER

## 2025-04-12 PROCEDURE — 25000003 PHARM REV CODE 250: Performed by: INTERNAL MEDICINE

## 2025-04-12 PROCEDURE — 96375 TX/PRO/DX INJ NEW DRUG ADDON: CPT

## 2025-04-12 PROCEDURE — 63600175 PHARM REV CODE 636 W HCPCS: Mod: JZ

## 2025-04-12 PROCEDURE — 85025 COMPLETE CBC W/AUTO DIFF WBC: CPT | Performed by: NURSE PRACTITIONER

## 2025-04-12 PROCEDURE — G0378 HOSPITAL OBSERVATION PER HR: HCPCS

## 2025-04-12 PROCEDURE — 36415 COLL VENOUS BLD VENIPUNCTURE: CPT | Performed by: NURSE PRACTITIONER

## 2025-04-12 RX ORDER — HYDROMORPHONE HYDROCHLORIDE 1 MG/ML
0.5 INJECTION, SOLUTION INTRAMUSCULAR; INTRAVENOUS; SUBCUTANEOUS ONCE
Refills: 0 | Status: COMPLETED | OUTPATIENT
Start: 2025-04-12 | End: 2025-04-12

## 2025-04-12 RX ORDER — ONDANSETRON 4 MG/1
8 TABLET, ORALLY DISINTEGRATING ORAL EVERY 8 HOURS PRN
Qty: 30 TABLET | Refills: 0 | Status: SHIPPED | OUTPATIENT
Start: 2025-04-12

## 2025-04-12 RX ADMIN — FAMOTIDINE 20 MG: 20 TABLET, FILM COATED ORAL at 09:04

## 2025-04-12 RX ADMIN — HYDROMORPHONE HYDROCHLORIDE 0.5 MG: 1 INJECTION, SOLUTION INTRAMUSCULAR; INTRAVENOUS; SUBCUTANEOUS at 04:04

## 2025-04-12 RX ADMIN — FERROUS SULFATE TAB 325 MG (65 MG ELEMENTAL FE) 1 EACH: 325 (65 FE) TAB at 09:04

## 2025-04-12 NOTE — PLAN OF CARE
.[email or web address]  Problem: Adult Inpatient Plan of Care  Goal: Plan of Care Review  Outcome: Met  Goal: Patient-Specific Goal (Individualized)  Outcome: Met  Goal: Absence of Hospital-Acquired Illness or Injury  Outcome: Met  Goal: Optimal Comfort and Wellbeing  Outcome: Met  Goal: Readiness for Transition of Care  Outcome: Met     Problem: Infection  Goal: Absence of Infection Signs and Symptoms  Outcome: Met

## 2025-04-12 NOTE — DISCHARGE SUMMARY
[facility] Medicine  Discharge Summary      Patient Name: Marva Elizondo  MRN: [id number]  SHIRLEY: [id number]  Patient Class: OP- Observation  Admission Date: 4/10/2025  Hospital Length of Stay: 0 days  Discharge Date and Time: 04/12/2025 12:54 PM  Attending Physician: Benson Young DO   Discharging Provider: Hawa Marley NP  Primary Care Provider: Nimco Underwood NP    Primary Care Team: Networked reference to record PCT     HPI:   29-year-old female with no significant medical history presents to the emergency room for vomiting and diarrhea for 6 days, with abdominal pain, fever of 101, body aches, nausea, and diarrhea.    In the ER, CBC with hemoglobin 8.6 and MCV 60 iron studies consistent with iron-deficiency anemia, CMP with potassium 3.4, Mag 1.5 EKG with normal sinus rhythm, C diff negative, influenza negative COVID negative chest x-ray with no infiltrates or consolidation.  Cat scan of abdomen likely enteritis.  Stool culture sent positive for Astrovirus and Sapovirus   Magnesium and potassium replaced in ER. Levsin given, IV NS bolus given in ER.     Admit to hospital medicine for gastroenteritis, electrolyte imbalance.     * No surgery found *      Hospital Course:   Patient monitored closely during hospitalization.  She was admitted with gastroenteritis.  She was found to be positive for astrovirus and sapovirus on stool sample.  C. Diff negative.  Blood cultures with no growth to date.  CT abd/pel with enteritis.  She was given IV fluid hydration.  Electrolytes were replaced.  Patient had improvement in her symptoms.  She was seen and examined on day of discharge and deemed stable for discharge home.  Return precautions given.  Patient verbalizes understanding and is agreeable to discharge plan.  She is to follow up with her PCP in 1 week.     Goals of Care Treatment Preferences:  Code Status: Full Code         Consults:     Final Active Diagnoses:     "Diagnosis Date Noted POA    PRINCIPAL PROBLEM:  Gastroenteritis [K52.9] 04/10/2025 Yes    Anemia [D64.9] 04/10/2025 Yes    Hypokalemia [E87.6] 04/10/2025 Yes    Hypomagnesemia [E83.42] 04/10/2025 Yes    Volume depletion [E86.9] 04/10/2025 Yes      Problems Resolved During this Admission:       Discharged Condition: stable    Disposition: Home or Self Care    Follow Up:   Follow-up Information       Nimco Underwood NP Follow up.    Specialty: Family Medicine  Why: PCP office is apparently closed due to "unfortunate circumstances"  Contact information:  [address] [facility] [address]  [phone]               Maehler, Jewel A., FNP. Go on 4/24/2025.    Specialty: Family Medicine  Why: Hospital follow up scheduled at 11:00 AM.  Contact information:  [address] [facility]  [address]  [facility] [address]  [phone]                           Patient Instructions:      Diet Adult Regular   Order Comments: bland     Notify your health care provider if you experience any of the following:  temperature >100.4     Notify your health care provider if you experience any of the following:  persistent nausea and vomiting or diarrhea     Notify your health care provider if you experience any of the following:  severe uncontrolled pain     Notify your health care provider if you experience any of the following:  difficulty breathing or increased cough     Notify your health care provider if you experience any of the following:  severe persistent headache     Notify your health care provider if you experience any of the following:  persistent dizziness, light-headedness, or visual disturbances     Notify your health care provider if you experience any of the following:  increased confusion or weakness     Activity as tolerated       Significant Diagnostic Studies: Labs: CMP   Recent Labs   Lab 04/11/25  0417 04/12/25  0449    135*   K 3.6 3.9   CO2 21* 18*   BUN 13 8   CREATININE 0.6 0.6   CALCIUM " 7.8* 8.1*   ALBUMIN 3.1* 2.9*   BILITOT 0.6 0.2   ALKPHOS 54 49   AST 17 23   ALT 10 8*   , CBC   Recent Labs   Lab 04/11/25  0417 04/12/25  0449   WBC 7.36 5.49   HGB 8.1* 7.9*   HCT 27.9* 27.9*   * 585*   , and All labs within the past 24 hours have been reviewed    Pending Diagnostic Studies:       None           Medications:  Reconciled Home Medications:      Medication List        START taking these medications      ondansetron 4 MG Tbdl  Commonly known as: ZOFRAN-ODT  Take 2 tablets (8 mg total) by mouth every 8 (eight) hours as needed (nausea or vomiting).              Indwelling Lines/Drains at time of discharge:   Lines/Drains/Airways       None                   Time spent on the discharge of patient: 35 minutes         Hawa Marley NP  Department of Hospital Medicine  [facility]/Surg

## 2025-04-12 NOTE — PROGRESS NOTES
AVS virtually reviewed with Ms Elizondo in its entirety with emphasis on diet, medications, follow-up appointments and reasons to return to the ED or contact the [facility] On Call Nurse Care Line. Patient also encouraged to utilize their patient portal. Ease and convenience of use reiterated. Education complete and patient voiced understanding. All questions answered. Discharge teaching complete. Return to work letter provided as requested.

## 2025-04-12 NOTE — PLAN OF CARE
Problem: Adult Inpatient Plan of Care  Goal: Plan of Care Review  Outcome: Progressing  Goal: Patient-Specific Goal (Individualized)  Outcome: Progressing  Goal: Absence of Hospital-Acquired Illness or Injury  Outcome: Progressing  Goal: Optimal Comfort and Wellbeing  Outcome: Progressing  Goal: Readiness for Transition of Care  Outcome: Progressing     Problem: Infection  Goal: Absence of Infection Signs and Symptoms  Outcome: Progressing     Plan of care reviewed with patient. Patient verbalized complete understanding. Two hour patient rounding maintained throughout shift. All fall precautions maintained throughout shift. Bed in lowest position, wheels locked, call light within reach. Side rails up x2. Slip resistant socks maintained. Needs attended to.

## 2025-04-12 NOTE — DISCHARGE INSTRUCTIONS
Our goal at [facility] is to always give you outstanding care and exceptional service. You may receive a survey from [employer] by mail, text or e-mail in the next 24-48 hours asking about the care you received with us. The survey should only take 5-10 minutes to complete and is very important to us.     Your feedback provides us with a way to recognize our staff who work tirelessly to provide the best care! Also, your responses help us learn how to improve when your experience was below our aspiration of excellence. We are always looking for ways to improve your stay. We WILL use your feedback to continue making improvements to help us provide the highest quality care. We keep your personal information and feedback confidential. We appreciate your time completing this survey and can't wait to hear from you!!!    We look forward to your continued care with us! Thanks so much for choosing [facility] for your healthcare needs!

## 2025-04-12 NOTE — PLAN OF CARE
"[facility]/Surg  Discharge Final Note    Primary Care Provider: Nimco Underwood NP    Expected Discharge Date: 4/12/2025    Patient cleared for discharge from case management standpoint.      TN scheduled hospital follow up and placed on AVS.    Final Discharge Note (most recent)       Final Note - 04/12/25 1004          Final Note    Assessment Type Final Discharge Note     Anticipated Discharge Disposition Home or Self Care     What phone number can be called within the next 1-3 days to see how you are doing after discharge? [phone]     Hospital Resources/Appts/Education Provided Provided patient/caregiver with written discharge plan information;Provided education on problems/symptoms using teachback;Appointments scheduled and added to AVS        Post-Acute Status    Post-Acute Authorization Other     Other Status No Post-Acute Service Needs     Discharge Delays None known at this time                     Important Message from Medicare             Contact Info       Nimco Underwood NP   Specialty: Family Medicine   Relationship: PCP - General    [address]OP[address] [facility] [address]   Phone: [phone]       Next Steps: Follow up    Instructions: PCP office is apparently closed due to "unfortunate circumstances"    Neftaly Neves FNP   Specialty: Family Medicine    [address]  [address]  [facility] [place] [facility] [address]   Phone: [phone]       Next Steps: Go on 4/24/2025    Instructions: Hospital follow up scheduled at 11:00 AM.            "

## 2025-04-13 LAB
OHS QRS DURATION: 84 MS
OHS QTC CALCULATION: 421 MS

## 2025-04-14 LAB — BACTERIA UR CULT: NORMAL

## 2025-04-15 LAB
BACTERIA BLD CULT: NORMAL
BACTERIA BLD CULT: NORMAL

## 2025-04-22 ENCOUNTER — PATIENT MESSAGE (OUTPATIENT)
Facility: CLINIC | Age: 30
End: 2025-04-22